# Patient Record
Sex: FEMALE | Race: WHITE | NOT HISPANIC OR LATINO | URBAN - METROPOLITAN AREA
[De-identification: names, ages, dates, MRNs, and addresses within clinical notes are randomized per-mention and may not be internally consistent; named-entity substitution may affect disease eponyms.]

---

## 2019-09-19 ENCOUNTER — OUTPATIENT (OUTPATIENT)
Dept: OUTPATIENT SERVICES | Facility: HOSPITAL | Age: 49
LOS: 1 days | End: 2019-09-19
Payer: MEDICAID

## 2019-09-19 ENCOUNTER — APPOINTMENT (OUTPATIENT)
Dept: RADIOLOGY | Facility: IMAGING CENTER | Age: 49
End: 2019-09-19

## 2019-09-19 ENCOUNTER — APPOINTMENT (OUTPATIENT)
Dept: ULTRASOUND IMAGING | Facility: IMAGING CENTER | Age: 49
End: 2019-09-19

## 2019-09-19 DIAGNOSIS — M79.604 PAIN IN RIGHT LEG: ICD-10-CM

## 2019-09-19 PROCEDURE — 93970 EXTREMITY STUDY: CPT

## 2019-09-19 PROCEDURE — 73562 X-RAY EXAM OF KNEE 3: CPT | Mod: 26,RT

## 2019-09-19 PROCEDURE — 93970 EXTREMITY STUDY: CPT | Mod: 26

## 2019-09-19 PROCEDURE — 73562 X-RAY EXAM OF KNEE 3: CPT

## 2020-08-13 ENCOUNTER — TRANSCRIPTION ENCOUNTER (OUTPATIENT)
Age: 50
End: 2020-08-13

## 2020-08-13 ENCOUNTER — APPOINTMENT (OUTPATIENT)
Dept: SURGERY | Facility: CLINIC | Age: 50
End: 2020-08-13
Payer: MEDICAID

## 2020-08-13 VITALS — BODY MASS INDEX: 47.09 KG/M2 | WEIGHT: 293 LBS | HEIGHT: 66 IN

## 2020-08-13 DIAGNOSIS — Z78.9 OTHER SPECIFIED HEALTH STATUS: ICD-10-CM

## 2020-08-13 DIAGNOSIS — Z87.891 PERSONAL HISTORY OF NICOTINE DEPENDENCE: ICD-10-CM

## 2020-08-13 PROCEDURE — 99203 OFFICE O/P NEW LOW 30 MIN: CPT | Mod: 95

## 2020-08-13 RX ORDER — MINOCYCLINE HYDROCHLORIDE 75 MG/1
TABLET ORAL
Refills: 0 | Status: ACTIVE | COMMUNITY

## 2020-08-13 NOTE — ASSESSMENT
[FreeTextEntry1] : The patient is a morbidly obese woman, (BMI= 52), with significant weight related comorbidity including: Diabetes, hypercholesterolemia, fatty liver, PC0S and probable obstructive sleep apnea; unable to lose weight and improve her co-morbid conditions with medical management including diet, exercise and weight loss medication.

## 2020-08-13 NOTE — CONSULT LETTER
[Dear  ___] : Dear  [unfilled], [Consult Letter:] : I had the pleasure of evaluating your patient, [unfilled]. [Please see my note below.] : Please see my note below. [Consult Closing:] : Thank you very much for allowing me to participate in the care of this patient.  If you have any questions, please do not hesitate to contact me. [FreeTextEntry3] : Erich Marshall MD, FACS, FASMBS\par , Department of Surgery [facility] [place]\par Director of Metabolic and Bariatric Surgery [facility] [place]\par Director of Metabolic and Bariatric Surgery, and Robotic Minimally Invasive Surgery at [facility] [Sincerely,] : Sincerely,

## 2020-08-13 NOTE — HISTORY OF PRESENT ILLNESS
[Medical Office: ([facility])___] : at the medical office located in  [Home] : at home, [unfilled] , at the time of the visit. [Verbal consent obtained from patient] : the patient, [unfilled] [Mother] : mother [de-identified] : Sarah is a 49 y/o female being seen for weight loss surgery.  [de-identified] : The patient is a 50 year-old morbidly obese woman, 5 feet 6 inches 324 pounds (BMI= 52).  The patient presents via telehealth with her mother in the room requesting weight loss surgery.  She has been more than 100 lb. overweight for the past 25 years and is currently at her greatest weight.  \par \par The patient has tried numerous weight loss programs including Weight Watchers, and self-directed and physician supervised diets. Patient has not taken weight loss medication due to known side effects. Patient has lost up to 45 pounds on more than one occasion.\par \par \par The patient has diabetes, but denies shortness of breath with exertion, weight bearing joint pain, and low back pain.  She denies kidney, urinary tract disease, headaches, dizziness, seizure or neurological disorder.  She denies untreated thyroid, adrenal, pituitary disease, depression or psychiatric disorder.  The patient denies gallstones, heartburn, ulcer or liver disease but does have abnormal liver function tests.  She denies high blood pressure, complains of high cholesterol, but denies a history of heart attack or stroke.  She denies anemia, bleeding disorder, thrombosis, clotting disorder or easy bruisability.  She denies peripheral edema and complains of snoring and daytime somnolence. She has PCOS and complains of irregular menses, and infertility but denies hirsutism, acne, or stress urinary incontinence.

## 2020-09-08 ENCOUNTER — APPOINTMENT (OUTPATIENT)
Dept: GASTROENTEROLOGY | Facility: CLINIC | Age: 50
End: 2020-09-08
Payer: MEDICAID

## 2020-09-08 DIAGNOSIS — Z12.11 ENCOUNTER FOR SCREENING FOR MALIGNANT NEOPLASM OF COLON: ICD-10-CM

## 2020-09-08 PROCEDURE — 99442: CPT

## 2020-09-08 RX ORDER — POLYETHYLENE GLYCOL 3350 AND ELECTROLYTES WITH LEMON FLAVOR 236; 22.74; 6.74; 5.86; 2.97 G/4L; G/4L; G/4L; G/4L; G/4L
236 POWDER, FOR SOLUTION ORAL
Qty: 1 | Refills: 0 | Status: ACTIVE | COMMUNITY
Start: 2020-09-08 | End: 1900-01-01

## 2020-09-14 LAB
25(OH)D3 SERPL-MCNC: 30.5 NG/ML
ALBUMIN SERPL ELPH-MCNC: 4.2 G/DL
ALP BLD-CCNC: 56 U/L
ALT SERPL-CCNC: 17 U/L
ANION GAP SERPL CALC-SCNC: 13 MMOL/L
APTT BLD: 32.6 SEC
AST SERPL-CCNC: 21 U/L
BASOPHILS # BLD AUTO: 0.09 K/UL
BASOPHILS NFR BLD AUTO: 1 %
BILIRUB SERPL-MCNC: 0.3 MG/DL
BUN SERPL-MCNC: 19 MG/DL
CALCIUM SERPL-MCNC: 9.3 MG/DL
CHLORIDE SERPL-SCNC: 104 MMOL/L
CO2 SERPL-SCNC: 23 MMOL/L
CREAT SERPL-MCNC: 1.06 MG/DL
EOSINOPHIL # BLD AUTO: 0.21 K/UL
EOSINOPHIL NFR BLD AUTO: 2.4 %
FOLATE SERPL-MCNC: 10 NG/ML
GLUCOSE SERPL-MCNC: 120 MG/DL
HCG SERPL QL: NEGATIVE
HCT VFR BLD CALC: 46.9 %
HGB BLD-MCNC: 15.1 G/DL
IMM GRANULOCYTES NFR BLD AUTO: 0.3 %
INR PPP: 1.06 RATIO
IRON SERPL-MCNC: 44 UG/DL
LYMPHOCYTES # BLD AUTO: 1.95 K/UL
LYMPHOCYTES NFR BLD AUTO: 22.2 %
MAN DIFF?: NORMAL
MCHC RBC-ENTMCNC: 29 PG
MCHC RBC-ENTMCNC: 32.2 GM/DL
MCV RBC AUTO: 90.2 FL
MONOCYTES # BLD AUTO: 0.64 K/UL
MONOCYTES NFR BLD AUTO: 7.3 %
NEUTROPHILS # BLD AUTO: 5.86 K/UL
NEUTROPHILS NFR BLD AUTO: 66.8 %
PAPP-A SERPL-ACNC: <1 MIU/ML
PLATELET # BLD AUTO: 313 K/UL
POTASSIUM SERPL-SCNC: 4.9 MMOL/L
PROT SERPL-MCNC: 6.6 G/DL
PT BLD: 12.5 SEC
RBC # BLD: 5.2 M/UL
RBC # FLD: 14 %
SODIUM SERPL-SCNC: 140 MMOL/L
TSH SERPL-ACNC: 2.33 UIU/ML
VIT B12 SERPL-MCNC: 477 PG/ML
WBC # FLD AUTO: 8.78 K/UL

## 2020-09-15 DIAGNOSIS — E11.9 TYPE 2 DIABETES MELLITUS W/OUT COMPLICATIONS: ICD-10-CM

## 2020-09-15 LAB
ESTIMATED AVERAGE GLUCOSE: 163 MG/DL
HBA1C MFR BLD HPLC: 7.3 %

## 2020-09-21 LAB — VIT B1 SERPL-MCNC: 94.3 NMOL/L

## 2020-10-02 PROBLEM — Z12.11 SPECIAL SCREENING FOR MALIGNANT NEOPLASMS, COLON: Status: ACTIVE | Noted: 2020-09-08

## 2020-10-05 ENCOUNTER — OUTPATIENT (OUTPATIENT)
Dept: OUTPATIENT SERVICES | Facility: HOSPITAL | Age: 50
LOS: 1 days | End: 2020-10-05
Payer: MEDICAID

## 2020-10-05 VITALS
RESPIRATION RATE: 16 BRPM | WEIGHT: 293 LBS | SYSTOLIC BLOOD PRESSURE: 120 MMHG | OXYGEN SATURATION: 98 % | DIASTOLIC BLOOD PRESSURE: 70 MMHG | HEART RATE: 98 BPM | TEMPERATURE: 98 F | HEIGHT: 65 IN

## 2020-10-05 DIAGNOSIS — Z98.890 OTHER SPECIFIED POSTPROCEDURAL STATES: Chronic | ICD-10-CM

## 2020-10-05 DIAGNOSIS — Z12.11 ENCOUNTER FOR SCREENING FOR MALIGNANT NEOPLASM OF COLON: ICD-10-CM

## 2020-10-05 DIAGNOSIS — Z98.891 HISTORY OF UTERINE SCAR FROM PREVIOUS SURGERY: Chronic | ICD-10-CM

## 2020-10-05 PROCEDURE — 93010 ELECTROCARDIOGRAM REPORT: CPT

## 2020-10-05 RX ORDER — SODIUM CHLORIDE 9 MG/ML
3 INJECTION INTRAMUSCULAR; INTRAVENOUS; SUBCUTANEOUS EVERY 8 HOURS
Refills: 0 | Status: DISCONTINUED | OUTPATIENT
Start: 2020-10-09 | End: 2020-10-23

## 2020-10-05 RX ORDER — SODIUM CHLORIDE 9 MG/ML
1000 INJECTION, SOLUTION INTRAVENOUS
Refills: 0 | Status: DISCONTINUED | OUTPATIENT
Start: 2020-10-09 | End: 2020-10-23

## 2020-10-05 NOTE — H&P PST ADULT - NEGATIVE ENMT SYMPTOMS
no hearing difficulty/no tinnitus/no throat pain/no ear pain/no dysphagia/no vertigo/no sinus symptoms

## 2020-10-05 NOTE — H&P PST ADULT - HISTORY OF PRESENT ILLNESS
50 year old female in preperation for bariatric surgery states she is going for ... 50 year old female in preperation for bariatric surgery presents today for presurgical evaluation for EGD Anesthesia, Colonoscopy Anesthesia.

## 2020-10-05 NOTE — H&P PST ADULT - NSICDXPASTSURGICALHX_GEN_ALL_CORE_FT
PAST SURGICAL HISTORY:  H/O  section     S/P LASIK surgery     S/P LEEP (loop electrosurgical excision procedure)

## 2020-10-05 NOTE — H&P PST ADULT - MUSCULOSKELETAL
details… detailed exam no joint warmth/no joint swelling/no calf tenderness/ROM intact/no joint erythema/normal strength

## 2020-10-05 NOTE — H&P PST ADULT - NSICDXPROBLEM_GEN_ALL_CORE_FT
PROBLEM DIAGNOSES  Problem: Encounter for screening for malignant neoplasm of colon  Assessment and Plan: Pt scheduled for surgery on 10/9/2020.  Pre-op instructions provided. Pt verbalized understanding.   Pepcid provided for GI prophylaxis.   Pt given urine specimen cup for ucg on admission.   Pt states she is already scheduled for preop COVID testing.   OR booking notified of MICHELLE precautions.

## 2020-10-05 NOTE — H&P PST ADULT - ASSESSMENT
50 year old female in preparation for bariatric surgery presents today for presurgical evaluation for EGD/Colonoscopy Anesthesia.

## 2020-10-05 NOTE — H&P PST ADULT - NSICDXPASTMEDICALHX_GEN_ALL_CORE_FT
PAST MEDICAL HISTORY:  Childhood asthma     DM (diabetes mellitus) newly diagnosed - attempting diet control    History of Helicobacter pylori infection     Migraines     Morbid obesity     PCOS (polycystic ovarian syndrome)

## 2020-10-06 ENCOUNTER — APPOINTMENT (OUTPATIENT)
Dept: DISASTER EMERGENCY | Facility: CLINIC | Age: 50
End: 2020-10-06

## 2020-10-07 LAB — SARS-COV-2 N GENE NPH QL NAA+PROBE: NOT DETECTED

## 2020-10-08 ENCOUNTER — APPOINTMENT (OUTPATIENT)
Dept: PULMONOLOGY | Facility: CLINIC | Age: 50
End: 2020-10-08
Payer: MEDICAID

## 2020-10-08 VITALS
BODY MASS INDEX: 48.82 KG/M2 | SYSTOLIC BLOOD PRESSURE: 122 MMHG | OXYGEN SATURATION: 98 % | DIASTOLIC BLOOD PRESSURE: 78 MMHG | HEIGHT: 65 IN | WEIGHT: 293 LBS | TEMPERATURE: 97.6 F | HEART RATE: 96 BPM

## 2020-10-08 DIAGNOSIS — R06.83 SNORING: ICD-10-CM

## 2020-10-08 DIAGNOSIS — Z00.00 ENCOUNTER FOR GENERAL ADULT MEDICAL EXAMINATION W/OUT ABNORMAL FINDINGS: ICD-10-CM

## 2020-10-08 PROCEDURE — 94727 GAS DIL/WSHOT DETER LNG VOL: CPT

## 2020-10-08 PROCEDURE — 94010 BREATHING CAPACITY TEST: CPT

## 2020-10-08 PROCEDURE — 99203 OFFICE O/P NEW LOW 30 MIN: CPT | Mod: 25

## 2020-10-08 PROCEDURE — 94729 DIFFUSING CAPACITY: CPT

## 2020-10-08 NOTE — ASSESSMENT
[FreeTextEntry1] : 50 year old female without significant PMHx presents for pre surgical optimization -prior to planned bariatric surgery\par \par Recommend Home Sleep Study\par \par Follow up pending sleep study

## 2020-10-08 NOTE — PROCEDURE
[FreeTextEntry1] : PFT 10/8/20 personally reviewed minimal obstructive ventilatory defect with moderate gas exchange abnormality

## 2020-10-08 NOTE — HISTORY OF PRESENT ILLNESS
[Former] : former [< 30 pack-years] : < 30 pack-years [Never] : never [Recent  Weight Gain] : recent  weight gain [Snoring] : snoring [TextBox_4] : Patient is a 50 year old female never smoker recently diagnosed DM, presents to [facility] for pre surgical risk stratification prior to planned bariatric surgery.  Patient has been in her usual state of health.  She reports no respiratory issues.  She does admit to snoring.  She denies apneic episodes, excessive daytime sleepiness, or other complaints. [TextBox_11] : 1 [TextBox_13] : 10 [YearQuit] : 2010

## 2020-10-09 ENCOUNTER — OUTPATIENT (OUTPATIENT)
Dept: OUTPATIENT SERVICES | Facility: HOSPITAL | Age: 50
LOS: 1 days | Discharge: HOME | End: 2020-10-09
Payer: MEDICAID

## 2020-10-09 ENCOUNTER — APPOINTMENT (OUTPATIENT)
Dept: GASTROENTEROLOGY | Facility: HOSPITAL | Age: 50
End: 2020-10-09

## 2020-10-09 ENCOUNTER — RESULT REVIEW (OUTPATIENT)
Age: 50
End: 2020-10-09

## 2020-10-09 VITALS
HEART RATE: 79 BPM | SYSTOLIC BLOOD PRESSURE: 108 MMHG | RESPIRATION RATE: 16 BRPM | OXYGEN SATURATION: 100 % | DIASTOLIC BLOOD PRESSURE: 76 MMHG

## 2020-10-09 VITALS
WEIGHT: 293 LBS | HEART RATE: 78 BPM | DIASTOLIC BLOOD PRESSURE: 59 MMHG | HEIGHT: 65 IN | TEMPERATURE: 98 F | SYSTOLIC BLOOD PRESSURE: 103 MMHG | OXYGEN SATURATION: 98 % | RESPIRATION RATE: 17 BRPM

## 2020-10-09 DIAGNOSIS — Z98.890 OTHER SPECIFIED POSTPROCEDURAL STATES: Chronic | ICD-10-CM

## 2020-10-09 DIAGNOSIS — Z12.11 ENCOUNTER FOR SCREENING FOR MALIGNANT NEOPLASM OF COLON: ICD-10-CM

## 2020-10-09 DIAGNOSIS — Z98.891 HISTORY OF UTERINE SCAR FROM PREVIOUS SURGERY: Chronic | ICD-10-CM

## 2020-10-09 LAB
GLUCOSE BLDC GLUCOMTR-MCNC: 104 MG/DL — HIGH (ref 70–99)
HCG UR QL: NEGATIVE — SIGNIFICANT CHANGE UP

## 2020-10-09 PROCEDURE — 43239 EGD BIOPSY SINGLE/MULTIPLE: CPT | Mod: GC

## 2020-10-09 PROCEDURE — 88312 SPECIAL STAINS GROUP 1: CPT | Mod: 26

## 2020-10-09 PROCEDURE — 45378 DIAGNOSTIC COLONOSCOPY: CPT | Mod: GC

## 2020-10-09 PROCEDURE — 88305 TISSUE EXAM BY PATHOLOGIST: CPT | Mod: 26

## 2020-10-09 RX ORDER — SODIUM CHLORIDE 9 MG/ML
1000 INJECTION, SOLUTION INTRAVENOUS
Refills: 0 | Status: DISCONTINUED | OUTPATIENT
Start: 2020-10-09 | End: 2020-10-23

## 2020-10-09 RX ADMIN — SODIUM CHLORIDE 30 MILLILITER(S): 9 INJECTION, SOLUTION INTRAVENOUS at 12:59

## 2020-10-09 NOTE — ASU PATIENT PROFILE, ADULT - VISION (WITH CORRECTIVE LENSES IF THE PATIENT USUALLY WEARS THEM):
Normal vision: sees adequately in most situations; can see medication labels, newsprint Cheiloplasty (Complex) Text: A decision was made to reconstruct the defect with a  cheiloplasty.  The defect was undermined extensively.  Additional obicularis oris muscle was excised with a 15 blade scalpel.  The defect was converted into a full thickness wedge to facilite a better cosmetic result.  Small vessels were then tied off with 5-0 monocyrl. The obicularis oris, superficial fascia, adipose and dermis were then reapproximated.  After the deeper layers were approximated the epidermis was reapproximated with particular care given to realign the vermilion border.

## 2020-10-09 NOTE — ASU PATIENT PROFILE, ADULT - PREOP PAIN SCORE
[de-identified] : I have recommended that the pt continue with a conservative treatment plan. Pt tolerated the injection given in office today. 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 Omeprazole once a day while on NSAID's and prescription strength Naprosyn as needed. Pt will be referred to pain management for epidural injections. A lumbar MRI has been ordered due to persistent pain. The patient will follow up after the MRI results have been obtained. FU to eval for sacral insuffciency fx
0

## 2020-10-15 LAB — SURGICAL PATHOLOGY STUDY: SIGNIFICANT CHANGE UP

## 2020-10-19 ENCOUNTER — NON-APPOINTMENT (OUTPATIENT)
Age: 50
End: 2020-10-19

## 2020-11-23 PROBLEM — E28.2 POLYCYSTIC OVARIAN SYNDROME: Chronic | Status: ACTIVE | Noted: 2020-10-05

## 2020-11-23 PROBLEM — E11.9 TYPE 2 DIABETES MELLITUS WITHOUT COMPLICATIONS: Chronic | Status: ACTIVE | Noted: 2020-10-05

## 2020-11-23 PROBLEM — Z86.19 PERSONAL HISTORY OF OTHER INFECTIOUS AND PARASITIC DISEASES: Chronic | Status: ACTIVE | Noted: 2020-10-05

## 2020-11-23 PROBLEM — J45.909 UNSPECIFIED ASTHMA, UNCOMPLICATED: Chronic | Status: ACTIVE | Noted: 2020-10-05

## 2020-11-23 PROBLEM — E66.01 MORBID (SEVERE) OBESITY DUE TO EXCESS CALORIES: Chronic | Status: ACTIVE | Noted: 2020-10-05

## 2020-11-23 PROBLEM — G43.909 MIGRAINE, UNSPECIFIED, NOT INTRACTABLE, WITHOUT STATUS MIGRAINOSUS: Chronic | Status: ACTIVE | Noted: 2020-10-05

## 2020-12-02 ENCOUNTER — OUTPATIENT (OUTPATIENT)
Dept: OUTPATIENT SERVICES | Facility: HOSPITAL | Age: 50
LOS: 1 days | End: 2020-12-02

## 2020-12-02 ENCOUNTER — RESULT REVIEW (OUTPATIENT)
Age: 50
End: 2020-12-02

## 2020-12-02 ENCOUNTER — APPOINTMENT (OUTPATIENT)
Dept: RADIOLOGY | Facility: HOSPITAL | Age: 50
End: 2020-12-02
Payer: MEDICAID

## 2020-12-02 DIAGNOSIS — Z98.890 OTHER SPECIFIED POSTPROCEDURAL STATES: Chronic | ICD-10-CM

## 2020-12-02 DIAGNOSIS — E66.01 MORBID (SEVERE) OBESITY DUE TO EXCESS CALORIES: ICD-10-CM

## 2020-12-02 DIAGNOSIS — Z98.891 HISTORY OF UTERINE SCAR FROM PREVIOUS SURGERY: Chronic | ICD-10-CM

## 2020-12-02 PROCEDURE — 74240 X-RAY XM UPR GI TRC 1CNTRST: CPT | Mod: 26

## 2021-01-15 ENCOUNTER — APPOINTMENT (OUTPATIENT)
Dept: PULMONOLOGY | Facility: CLINIC | Age: 51
End: 2021-01-15
Payer: MEDICAID

## 2021-01-15 PROCEDURE — 99442: CPT

## 2021-01-21 ENCOUNTER — TRANSCRIPTION ENCOUNTER (OUTPATIENT)
Age: 51
End: 2021-01-21

## 2021-01-22 ENCOUNTER — NON-APPOINTMENT (OUTPATIENT)
Age: 51
End: 2021-01-22

## 2021-01-22 NOTE — HISTORY OF PRESENT ILLNESS
[Home] : at home, [unfilled] , at the time of the visit. [Medical Office: ([facility])___] : at the medical office located in  [Verbal consent obtained from patient] : the patient, [unfilled] [TextBox_4] : 50 year old female never smoker presents for follow up recent Home Sleep Study Dx mild MICHELLE.  Patient reports she has been calling office and  having difficulty getting through to office..  She attempted to leave voicemail but my name is not mentioned to leave message.  Patient is requesting Sleep Report be sent to her Bariatric Physician as well as be sent to her.

## 2021-01-22 NOTE — ADDENDUM
[FreeTextEntry1] : Contacted patient on 1/22/20:\par - Patient's symptoms: Snoring, Dry nasal passages in AM, EDS, and Nonrestorative sleep.\par - I have discussed all the negative health consequences associated with obstructive and central sleep apnea including heart conditions/MI, hypertension, diabetes, chronic inflammation, memory issues, stroke, obesity, decreased libido, sleep related accidents, as well as anxiety and depression. Additional recommendations included: Avoid alcohol and sedatives at bedtime. Proper sleep hygiene such as maintaining a regular sleep routine, avoiding naps if possible, not watching TV or reading in bed,  and maintaining a quiet, comfortable bedroom. Sleepy driving avoidance and risks discussed with patient. Diet, exercise and weight loss suggested\par - Additionally, I have discussed the need for compliance to using the machine nightly for adequate therapy as well as for ongoing coverage by insurance companies. Without adequate compliance, the DME company/insurance company may deny the patient replacement equipment or may also have the right to re-possess the machine.  Compliance to most insurance companies requires 4 hours or greater of pap use nightly.  The patient verbalized understanding and knows the next course of action. He will await to hear from the [employer]-  [employer].\par - RX'ed Dreamstation APAP device with settings of 4-20 cmH20 and dreamwear FFM to be fit to patient. \par \par Patient advised to follow up after 30 nights of PAP therapy for evaluation of adequate treatment. \par

## 2021-02-02 ENCOUNTER — TRANSCRIPTION ENCOUNTER (OUTPATIENT)
Age: 51
End: 2021-02-02

## 2021-03-08 ENCOUNTER — TRANSCRIPTION ENCOUNTER (OUTPATIENT)
Age: 51
End: 2021-03-08

## 2021-03-10 ENCOUNTER — APPOINTMENT (OUTPATIENT)
Age: 51
End: 2021-03-10
Payer: MEDICAID

## 2021-03-10 VITALS
TEMPERATURE: 97.1 F | WEIGHT: 293 LBS | DIASTOLIC BLOOD PRESSURE: 78 MMHG | OXYGEN SATURATION: 99 % | HEIGHT: 66 IN | HEART RATE: 96 BPM | SYSTOLIC BLOOD PRESSURE: 122 MMHG | BODY MASS INDEX: 47.09 KG/M2

## 2021-03-10 DIAGNOSIS — G47.33 OBSTRUCTIVE SLEEP APNEA (ADULT) (PEDIATRIC): ICD-10-CM

## 2021-03-10 PROCEDURE — 99072 ADDL SUPL MATRL&STAF TM PHE: CPT

## 2021-03-10 PROCEDURE — 99214 OFFICE O/P EST MOD 30 MIN: CPT

## 2021-03-10 NOTE — HISTORY OF PRESENT ILLNESS
[TextBox_4] : Ms. Galo is a 50 year old, nonsmoking, female presents for follow up visit.  She has history of obesity, Mild MICHELLE, and diabetes. \par \par Her chief concern is discussing PAP data. \par \par Patient admits to intermittent snoring.  She usually goes to sleep 10P-1AM as this is her downtime and she awakens at 7:30/8A.  She states she sleeps less with CPAP and she awakens more frequently with device use.  She states she feels her quality of sleep has worsened with PAP use.  She states she is waking up with welts on her face r/t to mask use.  She notes size small mask fits her nose as she has a small nose, but it pinches her cheeks.  She does not she awakens feeling dry, but attributes that to her nasal passageways being tiny/narrow.  Patient states she has lost 40 lbs since HST was done and wonders if MICHELLE persists with her weight loss. \par \par She will have gastric sleeve placement surgery with Dr. Marshall.  She is hoping since she has been using PAP that she will be able to have her surgery scheduled.  \par \par She denies choking/gasping, EDS, or nonrestorative sleep. \par She denies fever/chills, decreased appetite, fatigue, SOB @ rest, chest tightness, wheezing, or any other pulmonary issues.

## 2021-03-10 NOTE — ASSESSMENT
[FreeTextEntry1] : Ms. Galo is a 50 year old, nonsmoking, female presents for follow up visit.  She has history of obesity, Mild MICHELLE, and diabetes. Her chief concern is discussing PAP data.  \par \par 1. MICHELLE: \par - I have discussed all the negative health consequences associated with obstructive and central sleep apnea including heart conditions/MI, hypertension, diabetes, chronic inflammation, memory issues, stroke, obesity, decreased libido, sleep related accidents, as well as anxiety and depression. Additional recommendations included: Avoid alcohol and sedatives at bedtime. Proper sleep hygiene such as maintaining a regular sleep routine, avoiding naps if possible, not watching TV or reading in bed,  and maintaining a quiet, comfortable bedroom. Sleepy driving avoidance and risks discussed with patient. Diet, exercise and weight loss suggested.\par - Patient is being adequately treated with PAP device. \par \par Patient to call with further questions and concerns.\par Patient verbalizes understanding of care plan and is agreeable.\par \par

## 2021-03-10 NOTE — DISCUSSION/SUMMARY
[FreeTextEntry1] : [employer] HST c/w mild MICHELLE\par 12/26: RDI 8.4, SPO2 <90% for 1.9 mins\par 12/27: RDI 9.2, SPO2 <90% for 1.5 mins\par \par Patient dispensed APAP device w/ settings of 4-20 cmH2O and set up 2/5/2021 through [facility].   Data obtained from [email or web address] w/ date range 2/5-3/9/21 shows adequate treatment and complaint:\par - % days with usage >4 hr: 87.9%\par - AHI: 0.9\par - Avg. CPAP Pressure: 8.6 \par - Avg Mask fit: 100% \par \par Discussed given the mild MICHELLE continued weight loss could possibly cure sleep apnea.  Discussed the way to definitively tell would be to have repeat sleep study.  She states she had to pay oopkt for her HST because her insurance did not cover it.  She notes she has 11 year old twins at home and it's hard for her to set aside time for an in lab study to be done.  \par

## 2021-03-10 NOTE — PHYSICAL EXAM
[No Acute Distress] : no acute distress [Normal Appearance] : normal appearance [No Neck Mass] : no neck mass [Normal Rate/Rhythm] : normal rate/rhythm [Normal S1, S2] : normal s1, s2 [No Murmurs] : no murmurs [No Resp Distress] : no resp distress [Clear to Auscultation Bilaterally] : clear to auscultation bilaterally [No Abnormalities] : no abnormalities [Normal Gait] : normal gait [No Clubbing] : no clubbing [No Cyanosis] : no cyanosis [No Edema] : no edema [FROM] : FROM [Normal Color/ Pigmentation] : normal color/ pigmentation [No Focal Deficits] : no focal deficits [Oriented x3] : oriented x3 [Normal Affect] : normal affect

## 2021-04-28 ENCOUNTER — OUTPATIENT (OUTPATIENT)
Dept: OUTPATIENT SERVICES | Facility: HOSPITAL | Age: 51
LOS: 1 days | End: 2021-04-28
Payer: MEDICAID

## 2021-04-28 VITALS
RESPIRATION RATE: 18 BRPM | DIASTOLIC BLOOD PRESSURE: 54 MMHG | HEART RATE: 95 BPM | WEIGHT: 293 LBS | HEIGHT: 66 IN | TEMPERATURE: 99 F | SYSTOLIC BLOOD PRESSURE: 128 MMHG | OXYGEN SATURATION: 98 %

## 2021-04-28 DIAGNOSIS — Z98.890 OTHER SPECIFIED POSTPROCEDURAL STATES: Chronic | ICD-10-CM

## 2021-04-28 DIAGNOSIS — Z01.818 ENCOUNTER FOR OTHER PREPROCEDURAL EXAMINATION: ICD-10-CM

## 2021-04-28 DIAGNOSIS — E66.01 MORBID (SEVERE) OBESITY DUE TO EXCESS CALORIES: ICD-10-CM

## 2021-04-28 DIAGNOSIS — Z98.891 HISTORY OF UTERINE SCAR FROM PREVIOUS SURGERY: Chronic | ICD-10-CM

## 2021-04-28 LAB
ALBUMIN SERPL ELPH-MCNC: 4.4 G/DL — SIGNIFICANT CHANGE UP (ref 3.3–5)
ALP SERPL-CCNC: 66 U/L — SIGNIFICANT CHANGE UP (ref 40–120)
ALT FLD-CCNC: 24 U/L — SIGNIFICANT CHANGE UP (ref 10–45)
ANION GAP SERPL CALC-SCNC: 17 MMOL/L — SIGNIFICANT CHANGE UP (ref 5–17)
AST SERPL-CCNC: 21 U/L — SIGNIFICANT CHANGE UP (ref 10–40)
BILIRUB SERPL-MCNC: 0.3 MG/DL — SIGNIFICANT CHANGE UP (ref 0.2–1.2)
BLD GP AB SCN SERPL QL: NEGATIVE — SIGNIFICANT CHANGE UP
BUN SERPL-MCNC: 24 MG/DL — HIGH (ref 7–23)
CALCIUM SERPL-MCNC: 9.5 MG/DL — SIGNIFICANT CHANGE UP (ref 8.4–10.5)
CHLORIDE SERPL-SCNC: 100 MMOL/L — SIGNIFICANT CHANGE UP (ref 96–108)
CO2 SERPL-SCNC: 21 MMOL/L — LOW (ref 22–31)
CREAT SERPL-MCNC: 0.9 MG/DL — SIGNIFICANT CHANGE UP (ref 0.5–1.3)
GLUCOSE SERPL-MCNC: 87 MG/DL — SIGNIFICANT CHANGE UP (ref 70–99)
HCT VFR BLD CALC: 45.6 % — HIGH (ref 34.5–45)
HGB BLD-MCNC: 14.4 G/DL — SIGNIFICANT CHANGE UP (ref 11.5–15.5)
MCHC RBC-ENTMCNC: 28 PG — SIGNIFICANT CHANGE UP (ref 27–34)
MCHC RBC-ENTMCNC: 31.6 GM/DL — LOW (ref 32–36)
MCV RBC AUTO: 88.7 FL — SIGNIFICANT CHANGE UP (ref 80–100)
NRBC # BLD: 0 /100 WBCS — SIGNIFICANT CHANGE UP (ref 0–0)
PLATELET # BLD AUTO: 331 K/UL — SIGNIFICANT CHANGE UP (ref 150–400)
POTASSIUM SERPL-MCNC: 4.2 MMOL/L — SIGNIFICANT CHANGE UP (ref 3.5–5.3)
POTASSIUM SERPL-SCNC: 4.2 MMOL/L — SIGNIFICANT CHANGE UP (ref 3.5–5.3)
PROT SERPL-MCNC: 7.6 G/DL — SIGNIFICANT CHANGE UP (ref 6–8.3)
RBC # BLD: 5.14 M/UL — SIGNIFICANT CHANGE UP (ref 3.8–5.2)
RBC # FLD: 14.1 % — SIGNIFICANT CHANGE UP (ref 10.3–14.5)
RH IG SCN BLD-IMP: POSITIVE — SIGNIFICANT CHANGE UP
SODIUM SERPL-SCNC: 138 MMOL/L — SIGNIFICANT CHANGE UP (ref 135–145)
WBC # BLD: 10.69 K/UL — HIGH (ref 3.8–10.5)
WBC # FLD AUTO: 10.69 K/UL — HIGH (ref 3.8–10.5)

## 2021-04-28 PROCEDURE — 80053 COMPREHEN METABOLIC PANEL: CPT

## 2021-04-28 PROCEDURE — 83036 HEMOGLOBIN GLYCOSYLATED A1C: CPT

## 2021-04-28 PROCEDURE — 86900 BLOOD TYPING SEROLOGIC ABO: CPT

## 2021-04-28 PROCEDURE — 86850 RBC ANTIBODY SCREEN: CPT

## 2021-04-28 PROCEDURE — 85027 COMPLETE CBC AUTOMATED: CPT

## 2021-04-28 PROCEDURE — 86901 BLOOD TYPING SEROLOGIC RH(D): CPT

## 2021-04-28 PROCEDURE — G0463: CPT

## 2021-04-28 RX ORDER — CEFAZOLIN SODIUM 1 G
3000 VIAL (EA) INJECTION ONCE
Refills: 0 | Status: DISCONTINUED | OUTPATIENT
Start: 2021-05-12 | End: 2021-05-13

## 2021-04-28 NOTE — H&P PST ADULT - NSICDXPROBLEM_GEN_ALL_CORE_FT
PROBLEM DIAGNOSES  Problem: Morbid obesity  Assessment and Plan: Robotic assisted laparoscopic vertical sleeve gastrectomy

## 2021-04-28 NOTE — H&P PST ADULT - HISTORY OF PRESENT ILLNESS
This is a 50 y/o female PMH morbid obesity This is a 52 y/o female PMH morbid obesity, mild MICHELLE on CPAP.  Presents today for robotic assisted laparoscopic vertical sleeve gastrectomy.

## 2021-04-29 LAB
A1C WITH ESTIMATED AVERAGE GLUCOSE RESULT: 6.4 % — HIGH (ref 4–5.6)
ESTIMATED AVERAGE GLUCOSE: 137 MG/DL — HIGH (ref 68–114)

## 2021-04-30 NOTE — PHARMACOTHERAPY INTERVENTION NOTE - COMMENTS
Vertical sleeve gastrectomy scheduled for 5/12/2021.  Patient medication reconciliation completed. Patient currently taking:   multivitamin: 1 tab(s) orally once a day (chewables)    Patient was instructed to use crushed, dissolvable, chewable, or liquid formulations of medications for 1 month. Patient was informed to take daily multivitamins post surgically. Patient reeducated on NSAID avoidance (ibuprofen, ASA, naproxen, aleve) as they increased risk of GI bleeding; may use APAP for mild pain otherwise contact prescriber for consult. Patient was informed on indications and directions for administration for hyoscyamine SL, acetaminophen liquid, ondansetron ODT, and omeprazole DRSoy Patient was instructed to take the medications as follows:    - Continue chewable multivitamins    Nighat SamuelD  PGY2 Pharmacotherapy Resident  [id number]

## 2021-05-06 ENCOUNTER — APPOINTMENT (OUTPATIENT)
Dept: SURGERY | Facility: CLINIC | Age: 51
End: 2021-05-06
Payer: MEDICAID

## 2021-05-06 VITALS — HEIGHT: 66 IN | BODY MASS INDEX: 47.09 KG/M2 | WEIGHT: 293 LBS

## 2021-05-06 PROCEDURE — 99215 OFFICE O/P EST HI 40 MIN: CPT | Mod: 95

## 2021-05-09 ENCOUNTER — OUTPATIENT (OUTPATIENT)
Dept: OUTPATIENT SERVICES | Facility: HOSPITAL | Age: 51
LOS: 1 days | End: 2021-05-09
Payer: MEDICAID

## 2021-05-09 DIAGNOSIS — Z98.890 OTHER SPECIFIED POSTPROCEDURAL STATES: Chronic | ICD-10-CM

## 2021-05-09 DIAGNOSIS — Z11.52 ENCOUNTER FOR SCREENING FOR COVID-19: ICD-10-CM

## 2021-05-09 DIAGNOSIS — Z98.891 HISTORY OF UTERINE SCAR FROM PREVIOUS SURGERY: Chronic | ICD-10-CM

## 2021-05-09 LAB — SARS-COV-2 RNA SPEC QL NAA+PROBE: SIGNIFICANT CHANGE UP

## 2021-05-09 PROCEDURE — U0003: CPT

## 2021-05-09 PROCEDURE — U0005: CPT

## 2021-05-09 PROCEDURE — C9803: CPT

## 2021-05-11 ENCOUNTER — TRANSCRIPTION ENCOUNTER (OUTPATIENT)
Age: 51
End: 2021-05-11

## 2021-05-12 ENCOUNTER — RESULT REVIEW (OUTPATIENT)
Age: 51
End: 2021-05-12

## 2021-05-12 ENCOUNTER — INPATIENT (INPATIENT)
Facility: HOSPITAL | Age: 51
LOS: 0 days | Discharge: HOME | DRG: 621 | End: 2021-05-13
Attending: SURGERY | Admitting: SURGERY
Payer: MEDICAID

## 2021-05-12 ENCOUNTER — APPOINTMENT (OUTPATIENT)
Dept: SURGERY | Facility: HOSPITAL | Age: 51
End: 2021-05-12
Payer: MEDICAID

## 2021-05-12 VITALS
OXYGEN SATURATION: 98 % | HEART RATE: 102 BPM | WEIGHT: 293 LBS | DIASTOLIC BLOOD PRESSURE: 72 MMHG | SYSTOLIC BLOOD PRESSURE: 146 MMHG | HEIGHT: 66 IN | RESPIRATION RATE: 18 BRPM | TEMPERATURE: 98 F

## 2021-05-12 DIAGNOSIS — Z98.890 OTHER SPECIFIED POSTPROCEDURAL STATES: Chronic | ICD-10-CM

## 2021-05-12 DIAGNOSIS — E66.01 MORBID (SEVERE) OBESITY DUE TO EXCESS CALORIES: ICD-10-CM

## 2021-05-12 DIAGNOSIS — Z98.891 HISTORY OF UTERINE SCAR FROM PREVIOUS SURGERY: Chronic | ICD-10-CM

## 2021-05-12 LAB
ANION GAP SERPL CALC-SCNC: 18 MMOL/L — HIGH (ref 5–17)
BASOPHILS # BLD AUTO: 0.06 K/UL — SIGNIFICANT CHANGE UP (ref 0–0.2)
BASOPHILS NFR BLD AUTO: 0.4 % — SIGNIFICANT CHANGE UP (ref 0–2)
BUN SERPL-MCNC: 15 MG/DL — SIGNIFICANT CHANGE UP (ref 7–23)
CALCIUM SERPL-MCNC: 8.6 MG/DL — SIGNIFICANT CHANGE UP (ref 8.4–10.5)
CHLORIDE SERPL-SCNC: 102 MMOL/L — SIGNIFICANT CHANGE UP (ref 96–108)
CO2 SERPL-SCNC: 17 MMOL/L — LOW (ref 22–31)
CREAT SERPL-MCNC: 0.93 MG/DL — SIGNIFICANT CHANGE UP (ref 0.5–1.3)
EOSINOPHIL # BLD AUTO: 0.04 K/UL — SIGNIFICANT CHANGE UP (ref 0–0.5)
EOSINOPHIL NFR BLD AUTO: 0.3 % — SIGNIFICANT CHANGE UP (ref 0–6)
GLUCOSE BLDC GLUCOMTR-MCNC: 108 MG/DL — HIGH (ref 70–99)
GLUCOSE BLDC GLUCOMTR-MCNC: 140 MG/DL — HIGH (ref 70–99)
GLUCOSE BLDC GLUCOMTR-MCNC: 170 MG/DL — HIGH (ref 70–99)
GLUCOSE SERPL-MCNC: 164 MG/DL — HIGH (ref 70–99)
HCG UR QL: NEGATIVE — SIGNIFICANT CHANGE UP
HCT VFR BLD CALC: 42 % — SIGNIFICANT CHANGE UP (ref 34.5–45)
HGB BLD-MCNC: 13.7 G/DL — SIGNIFICANT CHANGE UP (ref 11.5–15.5)
IMM GRANULOCYTES NFR BLD AUTO: 0.5 % — SIGNIFICANT CHANGE UP (ref 0–1.5)
LYMPHOCYTES # BLD AUTO: 1.26 K/UL — SIGNIFICANT CHANGE UP (ref 1–3.3)
LYMPHOCYTES # BLD AUTO: 9.2 % — LOW (ref 13–44)
MAGNESIUM SERPL-MCNC: 2.3 MG/DL — SIGNIFICANT CHANGE UP (ref 1.6–2.6)
MCHC RBC-ENTMCNC: 28.7 PG — SIGNIFICANT CHANGE UP (ref 27–34)
MCHC RBC-ENTMCNC: 32.6 GM/DL — SIGNIFICANT CHANGE UP (ref 32–36)
MCV RBC AUTO: 88.1 FL — SIGNIFICANT CHANGE UP (ref 80–100)
MONOCYTES # BLD AUTO: 0.65 K/UL — SIGNIFICANT CHANGE UP (ref 0–0.9)
MONOCYTES NFR BLD AUTO: 4.7 % — SIGNIFICANT CHANGE UP (ref 2–14)
NEUTROPHILS # BLD AUTO: 11.62 K/UL — HIGH (ref 1.8–7.4)
NEUTROPHILS NFR BLD AUTO: 84.9 % — HIGH (ref 43–77)
NRBC # BLD: 0 /100 WBCS — SIGNIFICANT CHANGE UP (ref 0–0)
PHOSPHATE SERPL-MCNC: 3.4 MG/DL — SIGNIFICANT CHANGE UP (ref 2.5–4.5)
PLATELET # BLD AUTO: 284 K/UL — SIGNIFICANT CHANGE UP (ref 150–400)
POTASSIUM SERPL-MCNC: 4.1 MMOL/L — SIGNIFICANT CHANGE UP (ref 3.5–5.3)
POTASSIUM SERPL-SCNC: 4.1 MMOL/L — SIGNIFICANT CHANGE UP (ref 3.5–5.3)
RBC # BLD: 4.77 M/UL — SIGNIFICANT CHANGE UP (ref 3.8–5.2)
RBC # FLD: 13.8 % — SIGNIFICANT CHANGE UP (ref 10.3–14.5)
SODIUM SERPL-SCNC: 137 MMOL/L — SIGNIFICANT CHANGE UP (ref 135–145)
WBC # BLD: 13.7 K/UL — HIGH (ref 3.8–10.5)
WBC # FLD AUTO: 13.7 K/UL — HIGH (ref 3.8–10.5)

## 2021-05-12 PROCEDURE — 88305 TISSUE EXAM BY PATHOLOGIST: CPT | Mod: 26

## 2021-05-12 PROCEDURE — 43775 LAP SLEEVE GASTRECTOMY: CPT

## 2021-05-12 PROCEDURE — S2900 ROBOTIC SURGICAL SYSTEM: CPT | Mod: NC

## 2021-05-12 RX ORDER — SODIUM CHLORIDE 9 MG/ML
1000 INJECTION, SOLUTION INTRAVENOUS
Refills: 0 | Status: DISCONTINUED | OUTPATIENT
Start: 2021-05-12 | End: 2021-05-12

## 2021-05-12 RX ORDER — HYOSCYAMINE SULFATE 0.13 MG
0.12 TABLET ORAL EVERY 6 HOURS
Refills: 0 | Status: DISCONTINUED | OUTPATIENT
Start: 2021-05-12 | End: 2021-05-13

## 2021-05-12 RX ORDER — ACETAMINOPHEN 500 MG
1000 TABLET ORAL ONCE
Refills: 0 | Status: COMPLETED | OUTPATIENT
Start: 2021-05-12 | End: 2021-05-12

## 2021-05-12 RX ORDER — SODIUM CHLORIDE 9 MG/ML
1000 INJECTION, SOLUTION INTRAVENOUS
Refills: 0 | Status: DISCONTINUED | OUTPATIENT
Start: 2021-05-12 | End: 2021-05-13

## 2021-05-12 RX ORDER — HYDROMORPHONE HYDROCHLORIDE 2 MG/ML
0.25 INJECTION INTRAMUSCULAR; INTRAVENOUS; SUBCUTANEOUS
Refills: 0 | Status: DISCONTINUED | OUTPATIENT
Start: 2021-05-12 | End: 2021-05-12

## 2021-05-12 RX ORDER — ACETAMINOPHEN 500 MG
15 TABLET ORAL
Qty: 300 | Refills: 0
Start: 2021-05-12 | End: 2021-05-16

## 2021-05-12 RX ORDER — PANTOPRAZOLE SODIUM 20 MG/1
40 TABLET, DELAYED RELEASE ORAL EVERY 24 HOURS
Refills: 0 | Status: DISCONTINUED | OUTPATIENT
Start: 2021-05-12 | End: 2021-05-13

## 2021-05-12 RX ORDER — SIMETHICONE 80 MG/1
80 TABLET, CHEWABLE ORAL ONCE
Refills: 0 | Status: COMPLETED | OUTPATIENT
Start: 2021-05-12 | End: 2021-05-12

## 2021-05-12 RX ORDER — DEXTROSE 50 % IN WATER 50 %
25 SYRINGE (ML) INTRAVENOUS ONCE
Refills: 0 | Status: DISCONTINUED | OUTPATIENT
Start: 2021-05-12 | End: 2021-05-13

## 2021-05-12 RX ORDER — ONDANSETRON 8 MG/1
1 TABLET, FILM COATED ORAL
Qty: 28 | Refills: 0
Start: 2021-05-12 | End: 2021-05-18

## 2021-05-12 RX ORDER — CEFAZOLIN SODIUM 1 G
3000 VIAL (EA) INJECTION EVERY 8 HOURS
Refills: 0 | Status: COMPLETED | OUTPATIENT
Start: 2021-05-12 | End: 2021-05-13

## 2021-05-12 RX ORDER — FOLIC ACID 0.8 MG
1 TABLET ORAL ONCE
Refills: 0 | Status: COMPLETED | OUTPATIENT
Start: 2021-05-12 | End: 2021-05-12

## 2021-05-12 RX ORDER — OMEPRAZOLE 10 MG/1
1 CAPSULE, DELAYED RELEASE ORAL
Qty: 30 | Refills: 0
Start: 2021-05-12 | End: 2021-06-10

## 2021-05-12 RX ORDER — ONDANSETRON 8 MG/1
4 TABLET, FILM COATED ORAL EVERY 6 HOURS
Refills: 0 | Status: DISCONTINUED | OUTPATIENT
Start: 2021-05-12 | End: 2021-05-13

## 2021-05-12 RX ORDER — HYOSCYAMINE SULFATE 0.13 MG
1 TABLET ORAL
Qty: 28 | Refills: 0
Start: 2021-05-12 | End: 2021-05-18

## 2021-05-12 RX ORDER — INSULIN LISPRO 100/ML
VIAL (ML) SUBCUTANEOUS EVERY 6 HOURS
Refills: 0 | Status: DISCONTINUED | OUTPATIENT
Start: 2021-05-12 | End: 2021-05-13

## 2021-05-12 RX ORDER — ENOXAPARIN SODIUM 100 MG/ML
40 INJECTION SUBCUTANEOUS EVERY 12 HOURS
Refills: 0 | Status: DISCONTINUED | OUTPATIENT
Start: 2021-05-12 | End: 2021-05-12

## 2021-05-12 RX ORDER — KETOROLAC TROMETHAMINE 30 MG/ML
30 SYRINGE (ML) INJECTION EVERY 6 HOURS
Refills: 0 | Status: DISCONTINUED | OUTPATIENT
Start: 2021-05-12 | End: 2021-05-13

## 2021-05-12 RX ORDER — POTASSIUM CHLORIDE 20 MEQ
10 PACKET (EA) ORAL
Refills: 0 | Status: DISCONTINUED | OUTPATIENT
Start: 2021-05-12 | End: 2021-05-13

## 2021-05-12 RX ORDER — HEPARIN SODIUM 5000 [USP'U]/ML
5000 INJECTION INTRAVENOUS; SUBCUTANEOUS ONCE
Refills: 0 | Status: COMPLETED | OUTPATIENT
Start: 2021-05-12 | End: 2021-05-12

## 2021-05-12 RX ORDER — HALOPERIDOL DECANOATE 100 MG/ML
0.5 INJECTION INTRAMUSCULAR EVERY 4 HOURS
Refills: 0 | Status: DISCONTINUED | OUTPATIENT
Start: 2021-05-12 | End: 2021-05-13

## 2021-05-12 RX ORDER — LIDOCAINE HCL 20 MG/ML
0.2 VIAL (ML) INJECTION ONCE
Refills: 0 | Status: DISCONTINUED | OUTPATIENT
Start: 2021-05-12 | End: 2021-05-12

## 2021-05-12 RX ORDER — THIAMINE MONONITRATE (VIT B1) 100 MG
100 TABLET ORAL ONCE
Refills: 0 | Status: COMPLETED | OUTPATIENT
Start: 2021-05-12 | End: 2021-05-12

## 2021-05-12 RX ORDER — DEXTROSE 50 % IN WATER 50 %
12.5 SYRINGE (ML) INTRAVENOUS ONCE
Refills: 0 | Status: DISCONTINUED | OUTPATIENT
Start: 2021-05-12 | End: 2021-05-13

## 2021-05-12 RX ORDER — CHLORHEXIDINE GLUCONATE 213 G/1000ML
1 SOLUTION TOPICAL ONCE
Refills: 0 | Status: DISCONTINUED | OUTPATIENT
Start: 2021-05-12 | End: 2021-05-12

## 2021-05-12 RX ORDER — DEXTROSE 50 % IN WATER 50 %
15 SYRINGE (ML) INTRAVENOUS ONCE
Refills: 0 | Status: DISCONTINUED | OUTPATIENT
Start: 2021-05-12 | End: 2021-05-13

## 2021-05-12 RX ORDER — GLUCAGON INJECTION, SOLUTION 0.5 MG/.1ML
1 INJECTION, SOLUTION SUBCUTANEOUS ONCE
Refills: 0 | Status: DISCONTINUED | OUTPATIENT
Start: 2021-05-12 | End: 2021-05-13

## 2021-05-12 RX ORDER — HEPARIN SODIUM 5000 [USP'U]/ML
5000 INJECTION INTRAVENOUS; SUBCUTANEOUS EVERY 8 HOURS
Refills: 0 | Status: DISCONTINUED | OUTPATIENT
Start: 2021-05-12 | End: 2021-05-13

## 2021-05-12 RX ORDER — SODIUM CHLORIDE 9 MG/ML
3 INJECTION INTRAMUSCULAR; INTRAVENOUS; SUBCUTANEOUS EVERY 8 HOURS
Refills: 0 | Status: DISCONTINUED | OUTPATIENT
Start: 2021-05-12 | End: 2021-05-12

## 2021-05-12 RX ADMIN — Medication 0.12 MILLIGRAM(S): at 16:08

## 2021-05-12 RX ADMIN — SODIUM CHLORIDE 150 MILLILITER(S): 9 INJECTION, SOLUTION INTRAVENOUS at 17:57

## 2021-05-12 RX ADMIN — HEPARIN SODIUM 5000 UNIT(S): 5000 INJECTION INTRAVENOUS; SUBCUTANEOUS at 22:04

## 2021-05-12 RX ADMIN — SODIUM CHLORIDE 150 MILLILITER(S): 9 INJECTION, SOLUTION INTRAVENOUS at 16:08

## 2021-05-12 RX ADMIN — PANTOPRAZOLE SODIUM 40 MILLIGRAM(S): 20 TABLET, DELAYED RELEASE ORAL at 10:19

## 2021-05-12 RX ADMIN — Medication 1: at 11:55

## 2021-05-12 RX ADMIN — Medication 30 MILLIGRAM(S): at 16:08

## 2021-05-12 RX ADMIN — Medication 0.12 MILLIGRAM(S): at 22:06

## 2021-05-12 RX ADMIN — HEPARIN SODIUM 5000 UNIT(S): 5000 INJECTION INTRAVENOUS; SUBCUTANEOUS at 13:32

## 2021-05-12 RX ADMIN — Medication 100 MILLIGRAM(S): at 11:20

## 2021-05-12 RX ADMIN — Medication 30 MILLIGRAM(S): at 22:02

## 2021-05-12 RX ADMIN — Medication 400 MILLIGRAM(S): at 13:32

## 2021-05-12 RX ADMIN — Medication 30 MILLIGRAM(S): at 22:32

## 2021-05-12 RX ADMIN — Medication 1000 MILLIGRAM(S): at 13:47

## 2021-05-12 RX ADMIN — ONDANSETRON 4 MILLIGRAM(S): 8 TABLET, FILM COATED ORAL at 17:56

## 2021-05-12 RX ADMIN — HEPARIN SODIUM 5000 UNIT(S): 5000 INJECTION INTRAVENOUS; SUBCUTANEOUS at 06:22

## 2021-05-12 RX ADMIN — SODIUM CHLORIDE 250 MILLILITER(S): 9 INJECTION, SOLUTION INTRAVENOUS at 10:58

## 2021-05-12 RX ADMIN — Medication 1 MILLIGRAM(S): at 10:57

## 2021-05-12 RX ADMIN — SIMETHICONE 80 MILLIGRAM(S): 80 TABLET, CHEWABLE ORAL at 11:18

## 2021-05-12 RX ADMIN — Medication 0.12 MILLIGRAM(S): at 10:18

## 2021-05-12 RX ADMIN — ONDANSETRON 4 MILLIGRAM(S): 8 TABLET, FILM COATED ORAL at 11:18

## 2021-05-12 RX ADMIN — Medication 200 MILLIGRAM(S): at 16:10

## 2021-05-12 NOTE — PACU DISCHARGE NOTE - NSPTMEETSDISCHCRITERIADT_GEN_A_CORE
12-May-2021 13:28 How Severe Is It?: mild Is This A New Presentation, Or A Follow-Up?: Bruises Additional History: Had blood drawn yesterday and has bruise at site in Left AC and on left wrist. No pain.  Wants assessment.

## 2021-05-12 NOTE — PRE-OP CHECKLIST - 1.
----- Message from Hima Khanna MD sent at 4/20/2021  9:19 AM CDT -----  Thanks Doc. Will follow up. He was on alendronate in recent past.  Thank you  Yaneth:  Could we see him for follow up please.   Thank you
Ok thanks Dr Colleen Whitaker.
Preop teaching done and emotional support provided to patient and family. Safety provided.

## 2021-05-13 ENCOUNTER — TRANSCRIPTION ENCOUNTER (OUTPATIENT)
Age: 51
End: 2021-05-13

## 2021-05-13 VITALS — OXYGEN SATURATION: 99 % | HEART RATE: 97 BPM

## 2021-05-13 LAB
ANION GAP SERPL CALC-SCNC: 12 MMOL/L — SIGNIFICANT CHANGE UP (ref 5–17)
BASOPHILS # BLD AUTO: 0.06 K/UL — SIGNIFICANT CHANGE UP (ref 0–0.2)
BASOPHILS NFR BLD AUTO: 0.6 % — SIGNIFICANT CHANGE UP (ref 0–2)
BUN SERPL-MCNC: 9 MG/DL — SIGNIFICANT CHANGE UP (ref 7–23)
CALCIUM SERPL-MCNC: 8.4 MG/DL — SIGNIFICANT CHANGE UP (ref 8.4–10.5)
CHLORIDE SERPL-SCNC: 108 MMOL/L — SIGNIFICANT CHANGE UP (ref 96–108)
CO2 SERPL-SCNC: 20 MMOL/L — LOW (ref 22–31)
CREAT SERPL-MCNC: 0.86 MG/DL — SIGNIFICANT CHANGE UP (ref 0.5–1.3)
EOSINOPHIL # BLD AUTO: 0.02 K/UL — SIGNIFICANT CHANGE UP (ref 0–0.5)
EOSINOPHIL NFR BLD AUTO: 0.2 % — SIGNIFICANT CHANGE UP (ref 0–6)
GLUCOSE BLDC GLUCOMTR-MCNC: 127 MG/DL — HIGH (ref 70–99)
GLUCOSE BLDC GLUCOMTR-MCNC: 92 MG/DL — SIGNIFICANT CHANGE UP (ref 70–99)
GLUCOSE BLDC GLUCOMTR-MCNC: 95 MG/DL — SIGNIFICANT CHANGE UP (ref 70–99)
GLUCOSE SERPL-MCNC: 100 MG/DL — HIGH (ref 70–99)
HCT VFR BLD CALC: 38.1 % — SIGNIFICANT CHANGE UP (ref 34.5–45)
HGB BLD-MCNC: 11.9 G/DL — SIGNIFICANT CHANGE UP (ref 11.5–15.5)
IMM GRANULOCYTES NFR BLD AUTO: 0.4 % — SIGNIFICANT CHANGE UP (ref 0–1.5)
LYMPHOCYTES # BLD AUTO: 1.78 K/UL — SIGNIFICANT CHANGE UP (ref 1–3.3)
LYMPHOCYTES # BLD AUTO: 19 % — SIGNIFICANT CHANGE UP (ref 13–44)
MAGNESIUM SERPL-MCNC: 2.2 MG/DL — SIGNIFICANT CHANGE UP (ref 1.6–2.6)
MCHC RBC-ENTMCNC: 28.1 PG — SIGNIFICANT CHANGE UP (ref 27–34)
MCHC RBC-ENTMCNC: 31.2 GM/DL — LOW (ref 32–36)
MCV RBC AUTO: 90.1 FL — SIGNIFICANT CHANGE UP (ref 80–100)
MONOCYTES # BLD AUTO: 0.71 K/UL — SIGNIFICANT CHANGE UP (ref 0–0.9)
MONOCYTES NFR BLD AUTO: 7.6 % — SIGNIFICANT CHANGE UP (ref 2–14)
NEUTROPHILS # BLD AUTO: 6.77 K/UL — SIGNIFICANT CHANGE UP (ref 1.8–7.4)
NEUTROPHILS NFR BLD AUTO: 72.2 % — SIGNIFICANT CHANGE UP (ref 43–77)
NRBC # BLD: 0 /100 WBCS — SIGNIFICANT CHANGE UP (ref 0–0)
PHOSPHATE SERPL-MCNC: 2.1 MG/DL — LOW (ref 2.5–4.5)
PLATELET # BLD AUTO: 252 K/UL — SIGNIFICANT CHANGE UP (ref 150–400)
POTASSIUM SERPL-MCNC: 3.9 MMOL/L — SIGNIFICANT CHANGE UP (ref 3.5–5.3)
POTASSIUM SERPL-SCNC: 3.9 MMOL/L — SIGNIFICANT CHANGE UP (ref 3.5–5.3)
RBC # BLD: 4.23 M/UL — SIGNIFICANT CHANGE UP (ref 3.8–5.2)
RBC # FLD: 14.2 % — SIGNIFICANT CHANGE UP (ref 10.3–14.5)
SODIUM SERPL-SCNC: 140 MMOL/L — SIGNIFICANT CHANGE UP (ref 135–145)
WBC # BLD: 9.38 K/UL — SIGNIFICANT CHANGE UP (ref 3.8–10.5)
WBC # FLD AUTO: 9.38 K/UL — SIGNIFICANT CHANGE UP (ref 3.8–10.5)

## 2021-05-13 PROCEDURE — 81025 URINE PREGNANCY TEST: CPT

## 2021-05-13 PROCEDURE — 80048 BASIC METABOLIC PNL TOTAL CA: CPT

## 2021-05-13 PROCEDURE — C9399: CPT

## 2021-05-13 PROCEDURE — 85025 COMPLETE CBC W/AUTO DIFF WBC: CPT

## 2021-05-13 PROCEDURE — S2900: CPT

## 2021-05-13 PROCEDURE — 82962 GLUCOSE BLOOD TEST: CPT

## 2021-05-13 PROCEDURE — C1889: CPT

## 2021-05-13 PROCEDURE — 88305 TISSUE EXAM BY PATHOLOGIST: CPT

## 2021-05-13 PROCEDURE — 83735 ASSAY OF MAGNESIUM: CPT

## 2021-05-13 PROCEDURE — 84100 ASSAY OF PHOSPHORUS: CPT

## 2021-05-13 RX ADMIN — ONDANSETRON 4 MILLIGRAM(S): 8 TABLET, FILM COATED ORAL at 06:25

## 2021-05-13 RX ADMIN — Medication 0.12 MILLIGRAM(S): at 06:24

## 2021-05-13 RX ADMIN — Medication 30 MILLIGRAM(S): at 05:30

## 2021-05-13 RX ADMIN — Medication 200 MILLIGRAM(S): at 00:54

## 2021-05-13 RX ADMIN — HEPARIN SODIUM 5000 UNIT(S): 5000 INJECTION INTRAVENOUS; SUBCUTANEOUS at 06:24

## 2021-05-13 RX ADMIN — Medication 0.12 MILLIGRAM(S): at 10:28

## 2021-05-13 RX ADMIN — Medication 30 MILLIGRAM(S): at 05:00

## 2021-05-13 RX ADMIN — ONDANSETRON 4 MILLIGRAM(S): 8 TABLET, FILM COATED ORAL at 00:54

## 2021-05-13 NOTE — DIETITIAN INITIAL EVALUATION ADULT. - OTHER INFO
Pt with multiple previous wt loss attempts per chart. Pt tried the following programs: Weight Watchers, portions control and was unable to lose and maintain significant wt loss. Per chart, pt met with outpatient RD and weighed 318 pounds (11/16/20). Pre-surgical wt (H&P) noted as 307.9 pounds (4/28/21). Current wt of 299 pounds (5/12/21). Pt now S/P laparoscopic vertical sleeve gastrectomy. Pt denies N+V, sipping on bariatric clear liquids during RD visit. Pt with knowledge of bariatric full liquid diet and receptive to in depth review/reinforcement. Pt reports home stock of protein shakes with plans to purchase more. Pt reports purchasing the necessary vitamins/minerals in form including a multivitamin with added elemental iron, calcium citrate with vitamin D, and sublingual B12. Pt was advised to take the multivitamin with elemental iron at least 2 hours apart from the calcium citrate with vitamin D for optimal absorption. Pt plans to schedule a follow up appointment with outpatient RD. Pt able to teach back all points discussed during interview.     Diet Education: 1. Laparoscopic  sleeve gastrectomy recommendations reviewed/reinforced (discharge instruction handout references). Bariatric full liquid diet reviewed- sugar free, caffeine free, no carbonated beverages, low fat, no straws, no chewing gum, 6 small meals/day gradually increasing volume, and sipping beverages slowly, no water during meals- drink water 1 hour before or after meals, meeting hydration and protein needs. Discussed vitamin/mineral supplement compliance as discussed above. 2. RD to remain available to reinforce nutrition education as requested by patient/family/caregiver.

## 2021-05-13 NOTE — PROGRESS NOTE ADULT - ATTENDING COMMENTS
I saw and examined the patient, and reviewed  the history, operative findings and data with the patient and staff  Agree with note which was also reviewed and edited where appropriate.  D/W patient, RN, residents and Fellow    Doing well POD#1 s/p RA LVSG  Continue postop protocol.  Home when meets d/c criteria.  f/u appointment confirmed.

## 2021-05-13 NOTE — DIETITIAN INITIAL EVALUATION ADULT. - PHYSCIAL ASSESSMENT
well nourished/obese Skin: free of pressure injuries per nursing flow sheets, noted with surgical incision

## 2021-05-13 NOTE — DIETITIAN INITIAL EVALUATION ADULT. - ORAL INTAKE PTA/DIET HISTORY
Pt reports following a full liquid diet for 2 weeks PTA as instructed by outpatient RD. Pt reports having Premier Protein shakes, Inspire Shakes, yogurt and soups. Confirms NKFA. Pt denies chewing/swallowing difficulty, nausea, vomiting, diarrhea, constipation. Was taking multivitamin supplements.

## 2021-05-13 NOTE — DISCHARGE NOTE PROVIDER - NSDCCPTREATMENT_GEN_ALL_CORE_FT
PRINCIPAL PROCEDURE  Procedure: Robot-assisted sleeve gastrectomy  Findings and Treatment: recover from surgery and bariatric staged meal progression

## 2021-05-13 NOTE — DISCHARGE NOTE PROVIDER - NSRESEARCHGRANT_OVERRIDEREC_GEN_A_CORE
This is a surgical and/or non-medical patient. Other (please specify)/This is a surgical and/or non-medical patient.

## 2021-05-13 NOTE — DISCHARGE NOTE PROVIDER - HOSPITAL COURSE
On May 12, 2021 Ms. Galo who has a history of mild MICHELLE on CPAP, newly diagnosed DM (diet controlled) and morbid obesity BMI 48.4 underwent Laparoscopic robotic Sleeve Gastrectomy. Bariatric ERAS protocol followed to include preoperative and perioperative use of DVT and SSI prophylaxis as well as multi-modal non-opioid analgesia. Patient tolerated the procedure well  extubated in the operating room then transferred to the PACU in stable condition. Once hemodynamically stable and effective pain control the patient was able to ambulate with assistance. Pt was also able to void within 4 hours following the procedure. The patient was later transferred to a bariatric unit and placed on bedside continuous pulse oximetry. Pain management included IV multi-modal non-opioid analgesia then transitioned to liquid as needed. The patient tolerated bariatric clear liquid the evening of surgery.    On POD #1 patient remained stable with no acute events overnight, has effective  pain control. Denies nausea and vomiting. Patient is ambulating independently and voiding as expected. The rest of the hospital course was uneventful, patient met 8-Point Bariatric Surgery D/C criteria and subsequently cleared for discharge home on POD#1. No extended VTE prophylaxis required ([facility] VTE Risk Stratification Risk  (<1% ). The patient will follow a protocol-derived staged meal progression supervised by the dietitian in the outpatient setting. Patient will follow-up with Dr. Marshall in 7-10 days, medical doctor and dietitian in 30 days. [facility] prior to discharge. Written discharge instruction explained and given to include VTE prevention. On May 12, 2021 Ms. Galo who has a history of mild MICHELLE on CPAP, newly diagnosed DM (diet controlled) and morbid obesity BMI 48.4 underwent Laparoscopic Robotic Sleeve Gastrectomy. Bariatric ERAS protocol followed to include preoperative and perioperative use of DVT and SSI prophylaxis as well as multi-modal non-opioid analgesia. Patient tolerated the procedure well  extubated in the operating room then transferred to the PACU in stable condition. Once hemodynamically stable and effective pain control the patient was able to ambulate with assistance. Pt was also able to void within 4 hours following the procedure. The patient was later transferred to a bariatric unit and placed on bedside continuous pulse oximetry. Pain management included IV multi-modal non-opioid analgesia. The patient started tolerating bariatric clear liquid the evening of surgery.    On POD #1 patient remained stable with no acute events overnight, has effective  pain control. Denies nausea and vomiting. Patient is ambulating independently and voiding as expected. The rest of the hospital course was uneventful, patient met 8-Point Bariatric Surgery D/C criteria and subsequently cleared for discharge home on POD#1. No extended VTE prophylaxis required ([facility] VTE Risk Stratification Low (<1% ). The patient will follow a protocol-derived staged meal progression supervised by the dietitian in the outpatient setting. Patient will follow-up with Dr. Marshall in 7-10 days, medical doctor and dietitian in 30 days. All appropriate [facility] obtained from [facility] prior to discharge. Written discharge instruction explained and given to include VTE prevention.

## 2021-05-13 NOTE — DIETITIAN INITIAL EVALUATION ADULT. - CHIEF COMPLAINT
This is a "51y Female with PMH of DM, astham, H-pylori, PCOS, and migraines now POD1 s/p robo-assisted sleeve gastrectomy. Patient stable and recovering well."

## 2021-05-13 NOTE — DISCHARGE NOTE PROVIDER - CARE PROVIDER_API CALL
Erich Marshall)  Surgery  [address] [place] [address] [id number]  Phone: [phone]  Fax: [phone]  Follow Up Time: 1 week

## 2021-05-13 NOTE — DISCHARGE NOTE PROVIDER - INSTRUCTIONS
Bariatric phase 1 full liquid diet starting tomorrow for a period of 2 weeks, no carbonated beverage, low salt no sugar liquid. Follow up with your dietician in 30 days

## 2021-05-13 NOTE — PROGRESS NOTE ADULT - ASSESSMENT
51y Female with PMH of DM, astham, H-pylori, PCOS, and migraines now POD1 s/p robo-assisted sleeve gastrectomy. Patient stable and recovering well.    Plan:  - Pain control as needed; tylenol/toradol  - bariatric clears  - DVT ppx  - ambulate as tolerated  - F/u AM labs    [facility]  [id number]

## 2021-05-13 NOTE — PHARMACOTHERAPY INTERVENTION NOTE - COMMENTS
Spoke to patient about absorption issues with medications and the need to crush tablets or open capsules for the next 30 days. Reinforced discussion points from previous counseling. Medication cards indicating discharge medications indication and side effects were provided. Informed patient of new prescriptions sent to pharmacy. Patient questions and concerns were answered and addressed. Patient demonstrated understanding.    No pre-op opioids  Post-op opioids:    PACU: 0 MME     2 Devin: 0 MME  Home: 0 MME     Emy Nash  PGY2 Pharmacotherapy Resident   [id number] Spoke to patient about absorption issues with medications and the need to crush tablets or open capsules for the next 30 days. Reinforced discussion points from previous counseling. Medication cards indicating discharge medications indication and side effects were provided. Informed patient of new prescriptions sent to pharmacy. Patient's questions and concerns were answered and addressed. Patient demonstrated understanding.    No pre-op opioids  Post-op opioids:    PACU: 0 MME     2 Devin: 0 MME  Home: 0 MME     Emy Nash  PGY2 Pharmacotherapy Resident   [id number]

## 2021-05-13 NOTE — DISCHARGE NOTE NURSING/CASE MANAGEMENT/SOCIAL WORK - PATIENT PORTAL LINK FT
You can access the FollowMyHealth Patient Portal offered by [facility] by registering at the following website: [email or web address]. By joining [employer]’s FollowMyHealth portal, you will also be able to view your health information using other applications (apps) compatible with our system.

## 2021-05-13 NOTE — DISCHARGE NOTE PROVIDER - NSDCMRMEDTOKEN_GEN_ALL_CORE_FT
acetaminophen 500 mg/15 mL oral liquid: 15 milliliter(s) orally every 6 hours, As Needed   hyoscyamine 0.125 mg sublingual tablet: 1 tab(s) sublingual every 6 hours MDD:4  multivitamin: 1 tab(s) orally once a day  omeprazole 40 mg oral delayed release capsule: 1 cap(s) orally once a day MDD:1 open and mix in applesauce  ondansetron 4 mg oral tablet, disintegratin tab(s) orally every 6 hours, As Needed

## 2021-05-13 NOTE — DIETITIAN INITIAL EVALUATION ADULT. - ETIOLOGY
related to excessive energy intake, inability to lose a significant amount of wt despite multiple attempts

## 2021-05-13 NOTE — DISCHARGE NOTE PROVIDER - NSDCCPCAREPLAN_GEN_ALL_CORE_FT
PRINCIPAL DISCHARGE DIAGNOSIS  Diagnosis: Morbid obesity  Assessment and Plan of Treatment: Bariatric staged meal progression, pain medication, increase physical acitivity, increase water intake, follow up As explained and outlined on the gastric sleeve discharge instructions. Call office [phone] for immediate medical/surgical concerns   Call office for nausea, vomiting, fever, abdominal pain and drainage from surgical sites. If you develop shortness of breath, difficulty breathing, chest pain, calf pain with swelling visit your closest emergency room

## 2021-05-13 NOTE — DIETITIAN INITIAL EVALUATION ADULT. - PERTINENT MEDS FT
lactated ringers @ 150ml/hr, Haldol, Levsin, Zofran, Protonix, potassium chloride Secondary Intention Text (Leave Blank If You Do Not Want): The defect will heal with secondary intention.

## 2021-05-13 NOTE — DISCHARGE NOTE PROVIDER - NSRESEARCHGRANT_CONTRAREASONSOTHERFT_GEN_A_CORE
[facility] VTE RISK STRATIFICATION LOW <1%, NO POST D/C PROPHYLAXIS NEEDED

## 2021-05-13 NOTE — PROGRESS NOTE ADULT - SUBJECTIVE AND OBJECTIVE BOX
Post Op Day#: 1    Subjective: Feeling pretty good, no N/V, no pain ."    Objective: No cardio-pulmonary adverse events overnight, denies N/V, effective pain control. Continuous pulse oximetry at bedside functioning. Mildly tachycardic @ 104 bpm, pt denies sob, c/p. She verbalized that she had just came back from walking in the hallway   Afebrile. AM Labs pending. Ambulated independently around the unit. Tolerating bariatric clears.  Voiding as expected. Insulin sliding scale for effective glucose control.                                              Vital Signs Last 24 Hrs  T(C): 36.5 (13 May 2021 06:52), Max: 37.2 (12 May 2021 21:38)  T(F): 97.7 (13 May 2021 06:52), Max: 99 (12 May 2021 21:38)  HR: 99 (13 May 2021 06:52) (63 - 102)  BP: 106/68 (13 May 2021 06:52) (106/68 - 156/81)  BP(mean): 97 (12 May 2021 14:00) (87 - 110)  RR: 18 (13 May 2021 06:52) (12 - 18)  SpO2: 99% (13 May 2021 06:52) (95% - 100%)                                               I&O's Summary    12 May 2021 07:01  -  13 May 2021 07:00  --------------------------------------------------------  IN: 3575 mL / OUT: 1700 mL / NET: 1875 mL                                                                          13.7   13.70 )-----------( 284      ( 12 May 2021 10:23 )             42.0                                                 05-12    137  |  102  |  15  ----------------------------<  164<H>  4.1   |  17<L>  |  0.93    Ca    8.6      12 May 2021 10:23  Phos  3.4     05-12  Mg     2.3     05-12      ceFAZolin   IVPB 3000 milliGRAM(s) IV Intermittent once  dextrose 40% Gel 15 Gram(s) Oral once  dextrose 5%. 1000 milliLiter(s) IV Continuous <Continuous>  dextrose 5%. 1000 milliLiter(s) IV Continuous <Continuous>  dextrose 50% Injectable 25 Gram(s) IV Push once  dextrose 50% Injectable 12.5 Gram(s) IV Push once  dextrose 50% Injectable 25 Gram(s) IV Push once  glucagon  Injectable 1 milliGRAM(s) IntraMuscular once  haloperidol IVPB 0.5 milliGRAM(s) IV Intermittent every 4 hours PRN  heparin   Injectable 5000 Unit(s) SubCutaneous every 8 hours  hyoscyamine SL 0.125 milliGRAM(s) SubLingual every 6 hours  insulin lispro (ADMELOG) corrective regimen sliding scale   SubCutaneous every 6 hours  lactated ringers. 1000 milliLiter(s) IV Continuous <Continuous>  ondansetron Injectable 4 milliGRAM(s) IV Push every 6 hours  pantoprazole  Injectable 40 milliGRAM(s) IV Push every 24 hours  potassium chloride  10 mEq/100 mL IVPB 10 milliEquivalent(s) IV Intermittent every 1 hour PRN  sodium chloride 0.9% 1000 milliLiter(s) IV Continuous <Continuous>      Physical Exam:         Lungs:  clear breath sounds b/l       Heart:  Regular rate & rhythm       Abdomen:  Obese, Soft, non-distended.  Scopes sites clean, dry and intact with steris. + bs, - flatus, no rebound or guarding       Skin:  intact, pannus w/o rash       Extremities: + pulses, no edema, no calf tenderness, negative sabino's     Extended VTE Risk Assessment Scores             [facility]  VTE Predicted RISK Low:   ( <1% ), No post D/C extended  VTE prophylaxis      Assessment and Plan: Lap Robotic Assisted Sleeve Gastrectomy POD # 1    - Bariatric Clear diet today then protocol derived staged meal progression supervised by RD in outpatient setting  - DVT/GI prophylaxis, Incentive spirometry  - Ambulate as tolerated  -  Procedure specific education including diet, vitamins and VTE preventio explained, written materials given  - Medication reviewed and reconciled, Bariatric meds obtained from [facility] prior to d/c  - D/C home later today once Bariatric 8-Point d/c criteria met  - F/u AM labs  -  Follow up with Dr Marshall in 7-10 days, Dietitian and PMD in 30 days.      Marjan Dunn, KRISTOPHER, ANP  [phone]
  POD #1    Overnight events:   - No acute events    SUBJECTIVE:  Pain well-controlled. Endorses flatus. Tolerating clears. Denies n/v.     OBJECTIVE:  Vital Signs Last 24 Hrs  T(C): 36.8 (13 May 2021 00:59), Max: 37.2 (12 May 2021 21:38)  T(F): 98.2 (13 May 2021 00:59), Max: 99 (12 May 2021 21:38)  HR: 99 (13 May 2021 00:59) (63 - 102)  BP: 108/68 (13 May 2021 00:59) (108/68 - 156/81)  BP(mean): 97 (12 May 2021 14:00) (87 - 110)  RR: 18 (13 May 2021 00:59) (12 - 18)  SpO2: 99% (13 May 2021 00:59) (95% - 100%)      05-12-21 @ 07:01  -  05-13-21 @ 02:33  --------------------------------------------------------  IN: 1675 mL / OUT: 700 mL / NET: 975 mL        Physical Examination:  GEN: NAD, resting quietly  PULM: symmetric chest rise bilaterally, no increased WOB  ABD: soft, appropriately tender, nondistended, no rebound or guarding, incision CDI  EXTR: no LE erythema, moving all extremities      LABS:                        13.7   13.70 )-----------( 284      ( 12 May 2021 10:23 )             42.0       05-12    137  |  102  |  15  ----------------------------<  164<H>  4.1   |  17<L>  |  0.93    Ca    8.6      12 May 2021 10:23  Phos  3.4     05-12  Mg     2.3     05-12

## 2021-05-14 ENCOUNTER — NON-APPOINTMENT (OUTPATIENT)
Age: 51
End: 2021-05-14

## 2021-05-17 LAB — SURGICAL PATHOLOGY STUDY: SIGNIFICANT CHANGE UP

## 2021-05-25 DIAGNOSIS — Z01.811 ENCOUNTER FOR PREPROCEDURAL RESPIRATORY EXAMINATION: ICD-10-CM

## 2021-05-25 DIAGNOSIS — Z01.818 ENCOUNTER FOR OTHER PREPROCEDURAL EXAMINATION: ICD-10-CM

## 2021-05-27 ENCOUNTER — APPOINTMENT (OUTPATIENT)
Dept: SURGERY | Facility: CLINIC | Age: 51
End: 2021-05-27
Payer: MEDICAID

## 2021-05-27 VITALS
HEART RATE: 87 BPM | SYSTOLIC BLOOD PRESSURE: 103 MMHG | HEIGHT: 66 IN | TEMPERATURE: 97.8 F | OXYGEN SATURATION: 98 % | DIASTOLIC BLOOD PRESSURE: 66 MMHG | WEIGHT: 286.13 LBS | BODY MASS INDEX: 45.99 KG/M2

## 2021-05-27 DIAGNOSIS — Z09 ENCOUNTER FOR FOLLOW-UP EXAMINATION AFTER COMPLETED TREATMENT FOR CONDITIONS OTHER THAN MALIGNANT NEOPLASM: ICD-10-CM

## 2021-05-27 PROCEDURE — 99024 POSTOP FOLLOW-UP VISIT: CPT

## 2021-05-27 NOTE — ASSESSMENT
[Previous Visit Weight: ___] : Previous visit weight: [unfilled] lbs [Today's Weight: ___] : Today's weight:[unfilled] lbs [Today's BMI: ___] : Today's BMI: [unfilled] [de-identified] : Plan:\par Progress to phase 2\par Increase fluids\par Continue caloric intake as described above\par Follow-up 1 month

## 2021-05-27 NOTE — REVIEW OF SYSTEMS
[Negative] : Allergic/Immunologic [Patient Intake Form Reviewed] : Patient intake form was reviewed [Joint Pain] : no joint pain [Joint Stiffness] : no joint stiffness [Muscle Pain] : no muscle pain [Muscle Weakness] : no muscle weakness [FreeTextEntry9] : +back pain

## 2021-05-27 NOTE — HISTORY OF PRESENT ILLNESS
[___gm Protein] : [unfilled] gm protein [Walking] : walking [Phase 1] : Phase 1 [___Kcal] : [unfilled] Kcal [de-identified] : +back pain, started after surgery no other complaints\par Tolerating a diet without difficulty\par No fever or chills, no chest pain or shortness of breath, no nausea vomiting or reflux [FreeTextEntry2] : 30 minutes/day [Hypertension] : no Hypertension [GERD] : no GERD [Hyperlipidemia] : no Hyperlipidemia

## 2021-05-27 NOTE — HISTORY OF PRESENT ILLNESS
[___gm Protein] : [unfilled] gm protein [Walking] : walking [Phase 1] : Phase 1 [___Kcal] : [unfilled] Kcal [de-identified] : +back pain, started after surgery no other complaints\par Tolerating a diet without difficulty\par No fever or chills, no chest pain or shortness of breath, no nausea vomiting or reflux [FreeTextEntry2] : 30 minutes/day [Hypertension] : no Hypertension [GERD] : no GERD [Hyperlipidemia] : no Hyperlipidemia

## 2021-05-27 NOTE — ASSESSMENT
[Previous Visit Weight: ___] : Previous visit weight: [unfilled] lbs [Today's Weight: ___] : Today's weight:[unfilled] lbs [Today's BMI: ___] : Today's BMI: [unfilled] [de-identified] : Plan:\par Progress to phase 2\par Increase fluids\par Continue caloric intake as described above\par Follow-up 1 month

## 2021-05-27 NOTE — PHYSICAL EXAM
[de-identified] : anicteric moist mucous membranes  [de-identified] : CTA  [de-identified] : RRR [de-identified] : abdomen soft nontender nondistended [de-identified] : surgical incision sites healed

## 2021-05-27 NOTE — PHYSICAL EXAM
[de-identified] : anicteric moist mucous membranes  [de-identified] : CTA  [de-identified] : RRR [de-identified] : abdomen soft nontender nondistended [de-identified] : surgical incision sites healed

## 2021-06-17 ENCOUNTER — APPOINTMENT (OUTPATIENT)
Dept: SURGERY | Facility: CLINIC | Age: 51
End: 2021-06-17
Payer: MEDICAID

## 2021-06-17 VITALS
SYSTOLIC BLOOD PRESSURE: 114 MMHG | TEMPERATURE: 97.1 F | WEIGHT: 278.38 LBS | HEART RATE: 82 BPM | OXYGEN SATURATION: 96 % | BODY MASS INDEX: 44.74 KG/M2 | DIASTOLIC BLOOD PRESSURE: 78 MMHG | HEIGHT: 66 IN | RESPIRATION RATE: 14 BRPM

## 2021-06-17 PROCEDURE — 99024 POSTOP FOLLOW-UP VISIT: CPT

## 2021-06-17 NOTE — HISTORY OF PRESENT ILLNESS
[Procedure: ___] : Procedure performed: [unfilled]  [Date of Surgery: ___] : Date of Surgery:   [unfilled] [Surgeon Name:   ___] : Surgeon Name: Dr. TITUS [Pre-Op Weight ___] : Pre-op weight was [unfilled] lbs [___ Months Post Op] : [unfilled] months [Walking] : walking [Diabetes] : Diabetes [Sleep Apnea] : Sleep Apnea [# of Diabetic Meds: ___] : # Diabetic Medications: [unfilled] [___Kcal] : [unfilled] Kcal [___gm Protein] : [unfilled] gm protein [Phase 3] : Phase 3 [FreeTextEntry2] : 30 minutes/day  [Hypertension] : no Hypertension [GERD] : no GERD [Hyperlipidemia] : no Hyperlipidemia

## 2021-06-17 NOTE — PHYSICAL EXAM
[de-identified] : anicteric mucous membranes moist  [de-identified] : CTA [de-identified] : RRR [de-identified] : abdomen soft nontender nondistended [de-identified] : surgical sites healing

## 2021-06-17 NOTE — ASSESSMENT
[___ Months Post Op] : [unfilled] months [Previous Visit Weight: ___] : Previous visit weight: [unfilled] lbs [Today's Weight: ___] : Today's weight:[unfilled] lbs [Today's BMI: ___] : Today's BMI: [unfilled] [Cormobidities: ___] : Comorbidities: [unfilled] [de-identified] : Plan:\par Advance to phase 4\par No restrictions\par Minimize carbohydrate intake\par Follow-up 1 month

## 2021-06-17 NOTE — REASON FOR VISIT
[Gastric Sleeve] : gastric sleeve [de-identified] : 5/12/2021 [de-identified] : Feels well, no complaints, tolerating phase 3 diet without difficulty walking 2 miles a day

## 2021-07-22 ENCOUNTER — APPOINTMENT (OUTPATIENT)
Dept: SURGERY | Facility: CLINIC | Age: 51
End: 2021-07-22
Payer: MEDICAID

## 2021-07-22 VITALS
SYSTOLIC BLOOD PRESSURE: 123 MMHG | HEART RATE: 73 BPM | HEIGHT: 66 IN | BODY MASS INDEX: 42.51 KG/M2 | DIASTOLIC BLOOD PRESSURE: 82 MMHG | WEIGHT: 264.5 LBS | OXYGEN SATURATION: 95 %

## 2021-07-22 DIAGNOSIS — E66.01 MORBID (SEVERE) OBESITY DUE TO EXCESS CALORIES: ICD-10-CM

## 2021-07-22 PROCEDURE — 99024 POSTOP FOLLOW-UP VISIT: CPT

## 2021-07-22 NOTE — HISTORY OF PRESENT ILLNESS
[Procedure: ___] : Procedure performed: [unfilled]  [Date of Surgery: ___] : Date of Surgery:   [unfilled] [Surgeon Name:   ___] : Surgeon Name: Dr. TITUS [Pre-Op Weight ___] : Pre-op weight was [unfilled] lbs [___ Months Post Op] : [unfilled] months [Phase 4] : Phase 4 [Walking] : walking [Diabetes] : Diabetes [# of Diabetic Meds: ___] : # Diabetic Medications: [unfilled] [Sleep Apnea] : Sleep Apnea [de-identified] : Feels well\par No complaints\par Tolerating phase 4 without difficulty\par Increasing activities\par Pleased with results today [___Kcal] : [unfilled] Kcal [___gm Protein] : [unfilled] gm protein [FreeTextEntry3] : 30 minutes/day [Hypertension] : no Hypertension [GERD] : no GERD [Hyperlipidemia] : no Hyperlipidemia

## 2021-07-22 NOTE — PHYSICAL EXAM
[de-identified] : anicteric, mucous membranes moist  [de-identified] : CTA [de-identified] : RRR [de-identified] : abdomen soft nontender nondistended  [de-identified] : surgical incision healed CDI

## 2021-07-22 NOTE — ASSESSMENT
[___ Months Post Op] : [unfilled] months [Previous Visit Weight: ___] : Previous visit weight: [unfilled] lbs [Cormobidities: ___] : Comorbidities: [unfilled] [Today's Weight: ___] : Today's weight:[unfilled] lbs [Today's BMI: ___] : Today's BMI: [unfilled] [de-identified] : Plan:\par Try to keep calories per 1000/day\par Less than 25 g carbohydrate diet\par Increase activities as tolerated\par Follow-up 3 months

## 2021-11-18 ENCOUNTER — APPOINTMENT (OUTPATIENT)
Age: 51
End: 2021-11-18
Payer: MEDICAID

## 2021-11-18 VITALS — HEIGHT: 66 IN | WEIGHT: 257 LBS | BODY MASS INDEX: 41.3 KG/M2

## 2021-11-18 DIAGNOSIS — Z98.84 BARIATRIC SURGERY STATUS: ICD-10-CM

## 2021-11-18 PROCEDURE — 99213 OFFICE O/P EST LOW 20 MIN: CPT | Mod: 95

## 2021-11-18 NOTE — HISTORY OF PRESENT ILLNESS
[Procedure: ___] : Procedure performed: [unfilled]  [Date of Surgery: ___] : Date of Surgery:   [unfilled] [Surgeon Name:   ___] : Surgeon Name: Dr. TITUS [Pre-Op Weight ___] : Pre-op weight was [unfilled] lbs [___ Months Post Op] : [unfilled] months [Diabetes] : Diabetes [# of Diabetic Meds: ___] : # Diabetic Medications: [unfilled] [Sleep Apnea] : Sleep Apnea [Phase 4] : Phase 4 [Hypertension] : no Hypertension [GERD] : no GERD [Hyperlipidemia] : no Hyperlipidemia

## 2021-11-18 NOTE — ASSESSMENT
[___ Months Post Op] : [unfilled] months [Previous Visit Weight: ___] : Previous visit weight: [unfilled] lbs [Cormobidities: ___] : Comorbidities: [unfilled] [Today's Weight: ___] : Today's weight:[unfilled] lbs [Today's BMI: ___] : Today's BMI: [unfilled] [de-identified] : Plan:\par Continue high protein less than 25 g of carbohydrates per day diet\par Increase activities as tolerated\par Follow-up 6 months

## 2021-11-18 NOTE — REASON FOR VISIT
[Home] : at home, [unfilled] , at the time of the visit. [Medical Office: ([facility])___] : at the medical office located in  [Verbal consent obtained from patient] : the patient, [unfilled] [Gastric Sleeve] : gastric sleeve [de-identified] : 5/12/2021 [de-identified] : 6-month status post sleeve gastrectomy\par No complaints\par Tolerating a high-protein diet\par 10-13,000 steps a day

## 2022-03-09 NOTE — H&P PST ADULT - GENERAL
details… [place] Counseling:  I discussed with the patient the risks of imiquimod including but not limited to erythema, scaling, itching, weeping, crusting, and pain.  Patient understands that the inflammatory response to imiquimod is variable from person to person and was educated regarded proper titration schedule.  If flu-like symptoms develop, patient knows to discontinue the medication and contact us.

## 2023-12-06 ENCOUNTER — NON-APPOINTMENT (OUTPATIENT)
Age: 53
End: 2023-12-06

## 2024-10-18 NOTE — PRE-OP CHECKLIST - LOOSE TEETH
POST OPERATIVE PROCEDURAL DOCUMENTATION  PRE-OP DIAGNOSIS: Severe MR    POST-OP DIAGNOSIS: Severe MR    PROCEDURE: Transesophageal Echocardiogram     Primary Physician: Dr. Argueta  Cardiology Fellow: Isabelle    Anesthesia:  Sedation as per documentation from anesthesia    CONDITION  [  ] Critical  [  ] Serious  [  ] Fair  [ x ] Good      ESTIMATED BLOOD LOSS: None    COMPLICATIONS: None    After risks and benefits of procedures were explained, informed consent was obtained and placed in chart.  Sedation as per documentation from anesthesia. The JASMINA probe was passed into the esophagus without difficulty.  Transesophageal images were obtained.  The JASMINA probe was removed without difficulty and examined.  There was no evidence for bleeding.  The patient tolerated the procedure well without any immediate JASMINA-related complications.      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 POST OPERATIVE PROCEDURAL DOCUMENTATION    PRE-OP DIAGNOSIS:  MR    POST-OP DIAGNOSIS:  Severe MR    PROCEDURE:  Transesophageal Echocardiogram     Primary Physician: Dr. Argueta  Cardiology Fellow: Isabelle    Anesthesia:  Sedation as per documentation from anesthesia    CONDITION  [  ] Critical  [  ] Serious  [  ] Fair  [ x ] Good      ESTIMATED BLOOD LOSS: None    COMPLICATIONS: None    After risks and benefits of procedures were explained, informed consent was obtained and placed in chart.  Sedation as per documentation from anesthesia. The JASMINA probe was passed into the esophagus without difficulty.  Transesophageal images were obtained.  The JASMINA probe was removed without difficulty and examined.  There was no evidence for bleeding.  The patient tolerated the procedure well without any immediate JASMINA-related complications.      Preliminary Findings:  LVEF >55%  No SHEILA thrombus  Severe MR secondary to P2 flail (EROA 0.53, MR volume 95 ml)  Mild TR  IAS: No R-> L shunt by color doppler    Full report to follow.      PLAN OF CARE:  [x] Discharge home      [x] No eating or drinking for 1 hour  [x] No driving for 24 hours    Results of procedure/plan of care discussed with patient/ in detail. no

## 2025-04-09 NOTE — ASU PREOP CHECKLIST - BOWEL PREP
Discontinue Simethicone.   Can try BEANO with meals in place of Simethicone.   Decreased Protonix to 20 mg daily in AM.   Continue Famotidine 20 mg in the evening.   Continue to try to drink at least 1-2 glasses/bottles of water daily.   Can try to puree food to make it easier to eat/digest.   Continue to watch for red flag symptoms.   Schedule a f/u OV in 6 months.     We did review GI red flag symptoms, including, but not limited to: chronic nausea, vomiting, diarrhea, chills, fever, and unintentional weight loss and should call or contact our office with any changes or concerns. I reviewed with the patient that if they notice any blood while vomiting or in their stool they should contact or office or go to the nearest emergency room for immediate evaluation. The patient was agreeable and verbalized an understanding.    
done